# Patient Record
Sex: FEMALE | Race: WHITE | NOT HISPANIC OR LATINO | Employment: UNEMPLOYED | URBAN - METROPOLITAN AREA
[De-identification: names, ages, dates, MRNs, and addresses within clinical notes are randomized per-mention and may not be internally consistent; named-entity substitution may affect disease eponyms.]

---

## 2019-11-13 ENCOUNTER — OFFICE VISIT (OUTPATIENT)
Dept: OTOLARYNGOLOGY | Facility: CLINIC | Age: 54
End: 2019-11-13
Payer: COMMERCIAL

## 2019-11-13 VITALS
DIASTOLIC BLOOD PRESSURE: 76 MMHG | WEIGHT: 135 LBS | SYSTOLIC BLOOD PRESSURE: 110 MMHG | HEIGHT: 63 IN | OXYGEN SATURATION: 97 % | HEART RATE: 85 BPM | TEMPERATURE: 98.2 F | BODY MASS INDEX: 23.92 KG/M2

## 2019-11-13 DIAGNOSIS — H61.23 BILATERAL IMPACTED CERUMEN: Primary | ICD-10-CM

## 2019-11-13 PROCEDURE — 99999 PR OFFICE/OUTPT VISIT,PROCEDURE ONLY: CPT | Performed by: OTOLARYNGOLOGY

## 2019-11-13 PROCEDURE — 69210 REMOVE IMPACTED EAR WAX UNI: CPT | Performed by: OTOLARYNGOLOGY

## 2019-11-13 RX ORDER — GINKGO BILOBA LEAF EXTRACT 60 MG
500 CAPSULE ORAL
COMMUNITY

## 2019-11-13 RX ORDER — BUTYROSPERMUM PARKII(SHEA BUTTER), SIMMONDSIA CHINENSIS (JOJOBA) SEED OIL, ALOE BARBADENSIS LEAF EXTRACT .01; 1; 3.5 G/100G; G/100G; G/100G
250 LIQUID TOPICAL 2 TIMES DAILY
COMMUNITY

## 2019-11-13 RX ORDER — CHOLECALCIFEROL (VITAMIN D3) 25 MCG
1000 TABLET ORAL DAILY
COMMUNITY

## 2019-11-13 SDOH — HEALTH STABILITY: MENTAL HEALTH: HOW OFTEN DO YOU HAVE A DRINK CONTAINING ALCOHOL?: NEVER

## 2019-11-13 NOTE — LETTER
November 13, 2019     Randy Florian MD  [address]  [place] [address]    Patient: Neris Dunn  YOB: 1965  Date of Visit: 11/13/2019      Dear Dr. Florian:    Thank you for referring Neris Dunn to me for evaluation. Below are my notes for this consultation.    If you have questions, please do not hesitate to call me. I look forward to following your patient along with you.         Sincerely,        Shirley Renner MD        CC: No Recipients  Shirley Renner MD  11/13/2019  4:59 PM  Signed  Procedure only - cerumen removal.      Shirley Renner MD  11/13/2019  4:59 PM  Signed  Cerumen removal  Date/Time: 11/13/2019 4:57 PM  Performed by: Shirley Renner MD  Authorized by: Shirley Renner MD     Consent:     Consent obtained:  Verbal    Consent given by:  Patient    Cerumen Removal-   The right and left ear was examined under the otomicroscope and noted to have cerumen impaction obstruting adequate visualization of the tympanic membrane.  Using a combination of suction, curettes, alligator and micro instruments, the cerumen was successfully extracted from the right external auditory canal the patient could not fully tolerate complete removal from the left.  At the conclusion of the procedure the EAC was noted to be healthy and intact without evidence of bleeding or trama. The tympanic membrane on the right appeared intact, with normal landmarks and no evidence of effusion on the left the tympanic membrane was still not visualized patient was instructed to use Debrox eardrops daily for a week and follow-up in a week for repeat attempt at cerumen removal of the left external auditory canal    Patient has extremely narrow ear canals and was unable to tolerate complete removal on the left side

## 2019-11-13 NOTE — PROCEDURES
Cerumen removal  Date/Time: 11/13/2019 4:57 PM  Performed by: Shirley Renner MD  Authorized by: Shirley Renner MD     Consent:     Consent obtained:  Verbal    Consent given by:  Patient    Cerumen Removal-   The right and left ear was examined under the otomicroscope and noted to have cerumen impaction obstruting adequate visualization of the tympanic membrane.  Using a combination of suction, curettes, alligator and micro instruments, the cerumen was successfully extracted from the right external auditory canal the patient could not fully tolerate complete removal from the left.  At the conclusion of the procedure the EAC was noted to be healthy and intact without evidence of bleeding or trama. The tympanic membrane on the right appeared intact, with normal landmarks and no evidence of effusion on the left the tympanic membrane was still not visualized patient was instructed to use Debrox eardrops daily for a week and follow-up in a week for repeat attempt at cerumen removal of the left external auditory canal    Patient has extremely narrow ear canals and was unable to tolerate complete removal on the left side

## 2019-11-25 ENCOUNTER — OFFICE VISIT (OUTPATIENT)
Dept: OTOLARYNGOLOGY | Facility: CLINIC | Age: 54
End: 2019-11-25
Payer: COMMERCIAL

## 2019-11-25 VITALS
SYSTOLIC BLOOD PRESSURE: 112 MMHG | BODY MASS INDEX: 23.39 KG/M2 | WEIGHT: 132 LBS | OXYGEN SATURATION: 99 % | HEART RATE: 74 BPM | RESPIRATION RATE: 18 BRPM | TEMPERATURE: 98.4 F | DIASTOLIC BLOOD PRESSURE: 72 MMHG | HEIGHT: 63 IN

## 2019-11-25 DIAGNOSIS — H60.532 ACUTE CONTACT OTITIS EXTERNA OF LEFT EAR: Primary | ICD-10-CM

## 2019-11-25 PROCEDURE — 99999 PR OFFICE/OUTPT VISIT,PROCEDURE ONLY: CPT | Performed by: OTOLARYNGOLOGY

## 2019-11-25 PROCEDURE — 92504 EAR MICROSCOPY EXAMINATION: CPT | Performed by: OTOLARYNGOLOGY

## 2019-11-25 NOTE — LETTER
November 25, 2019     Randy Florian MD  [address]  [place] [address]    Patient: Neris Dunn  YOB: 1965  Date of Visit: 11/25/2019      Dear Dr. Florian:    Thank you for referring Neris Dunn to me for evaluation. Below are my notes for this consultation.    If you have questions, please do not hesitate to call me. I look forward to following your patient along with you.         Sincerely,        Shirley Renner MD        CC: No Recipients        Shirley Renner MD  11/25/2019  4:40 PM  Signed  Procedures  Attempted cerumen removal from the left external auditory canal with microscope and suction.  Patient had done Debrox however it seem like it did not go in the ear as she used it.  Cerumen still appears very dried and hard against a narrow bottleneck type ear canal.  Patient unable to tolerate it was mild amount of bleeding along the edges of her very narrow ear canal and erythematous as with this externa.  Will prescribe Ciprodex eardrops for 1 week and follow-up in retry.

## 2019-11-25 NOTE — PROCEDURES
Procedures  Attempted cerumen removal from the left external auditory canal with microscope and suction.  Patient had done Debrox however it seem like it did not go in the ear as she used it.  Cerumen still appears very dried and hard against a narrow bottleneck type ear canal.  Patient unable to tolerate it was mild amount of bleeding along the edges of her very narrow ear canal and erythematous as with this externa.  Will prescribe Ciprodex eardrops for 1 week and follow-up in retry.

## 2019-11-27 ENCOUNTER — TELEPHONE (OUTPATIENT)
Dept: OTOLARYNGOLOGY | Facility: CLINIC | Age: 54
End: 2019-11-27

## 2019-11-27 RX ORDER — CIPROFLOXACIN AND DEXAMETHASONE 3; 1 MG/ML; MG/ML
2 SUSPENSION/ DROPS AURICULAR (OTIC) 2 TIMES DAILY
Qty: 7.5 ML | Refills: 1 | Status: SHIPPED | OUTPATIENT
Start: 2019-11-27 | End: 2019-12-04

## 2019-12-02 ENCOUNTER — OFFICE VISIT (OUTPATIENT)
Dept: OTOLARYNGOLOGY | Facility: CLINIC | Age: 54
End: 2019-12-02
Payer: COMMERCIAL

## 2019-12-02 VITALS
SYSTOLIC BLOOD PRESSURE: 118 MMHG | BODY MASS INDEX: 23.92 KG/M2 | HEART RATE: 82 BPM | HEIGHT: 63 IN | DIASTOLIC BLOOD PRESSURE: 68 MMHG | TEMPERATURE: 98 F | OXYGEN SATURATION: 98 % | WEIGHT: 135 LBS

## 2019-12-02 DIAGNOSIS — H61.22 IMPACTED CERUMEN OF LEFT EAR: Primary | ICD-10-CM

## 2019-12-02 PROCEDURE — 99999 PR OFFICE/OUTPT VISIT,PROCEDURE ONLY: CPT | Performed by: OTOLARYNGOLOGY

## 2019-12-02 PROCEDURE — 69210 REMOVE IMPACTED EAR WAX UNI: CPT | Performed by: OTOLARYNGOLOGY

## 2019-12-02 NOTE — LETTER
December 2, 2019     Randy Florian MD  [address]  [place] [address]    Patient: Neris Dunn  YOB: 1965  Date of Visit: 12/2/2019      Dear Dr. Florian:    Thank you for referring Neris Dunn to me for evaluation. Below are my notes for this consultation.    If you have questions, please do not hesitate to call me. I look forward to following your patient along with you.         Sincerely,        Shirley Renner MD        CC: No Recipients        Shirley Renner MD  12/2/2019  2:56 PM  Signed  Cerumen removal  Date/Time: 12/2/2019 2:53 PM  Performed by: Shirley Renner MD  Authorized by: Shirley Renner MD     Consent:     Consent obtained:  Verbal    Consent given by:  Patient    Cerumen Removal-   The left ear was examined under the otomicroscope and noted to have cerumen impaction obstruting adequate visualization of the tympanic membrane.  Using a combination of suction, curettes, alligator and micro instruments, the cerumen was successfully extracted.  At the conclusion of the procedure the EAC was noted to be healthy and intact without evidence of bleeding or trama. The tympanic membrane appeared intact, with normal landmarks and no evidence of effusion    The left ear canal was very narrow after many attempts with peroxide and suction and nontraumatic manipulation with instruments in the ear canal cerumen was successfully removed and the eardrum was visualized and appeared normal.  There was mild erythema of the ear canal recommend continue Ciprodex for 3 days.  Going forward recommend use of Debrox or finishing the Ciprodex once every 1 to 2 weeks to prevent cerumen accumulation.

## 2019-12-02 NOTE — PROCEDURES
Cerumen removal  Date/Time: 12/2/2019 2:53 PM  Performed by: Shirley Renner MD  Authorized by: Shirley Renner MD     Consent:     Consent obtained:  Verbal    Consent given by:  Patient    Cerumen Removal-   The left ear was examined under the otomicroscope and noted to have cerumen impaction obstruting adequate visualization of the tympanic membrane.  Using a combination of suction, curettes, alligator and micro instruments, the cerumen was successfully extracted.  At the conclusion of the procedure the EAC was noted to be healthy and intact without evidence of bleeding or trama. The tympanic membrane appeared intact, with normal landmarks and no evidence of effusion    The left ear canal was very narrow after many attempts with peroxide and suction and nontraumatic manipulation with instruments in the ear canal cerumen was successfully removed and the eardrum was visualized and appeared normal.  There was mild erythema of the ear canal recommend continue Ciprodex for 3 days.  Going forward recommend use of Debrox or finishing the Ciprodex once every 1 to 2 weeks to prevent cerumen accumulation.

## 2022-07-12 ENCOUNTER — TRANSCRIBE ORDERS (OUTPATIENT)
Dept: RADIOLOGY | Facility: HOSPITAL | Age: 57
End: 2022-07-12

## 2022-07-12 ENCOUNTER — HOSPITAL ENCOUNTER (OUTPATIENT)
Dept: RADIOLOGY | Facility: HOSPITAL | Age: 57
Discharge: HOME | End: 2022-07-12
Attending: FAMILY MEDICINE
Payer: COMMERCIAL

## 2022-07-12 DIAGNOSIS — M72.2 PLANTAR FASCIAL FIBROMATOSIS: Primary | ICD-10-CM

## 2022-07-12 DIAGNOSIS — M72.2 PLANTAR FASCIAL FIBROMATOSIS: ICD-10-CM

## 2022-07-12 PROCEDURE — 73630 X-RAY EXAM OF FOOT: CPT | Mod: LT

## 2022-07-12 PROCEDURE — 73650 X-RAY EXAM OF HEEL: CPT | Mod: LT,59

## 2023-04-04 ENCOUNTER — APPOINTMENT (OUTPATIENT)
Dept: URBAN - METROPOLITAN AREA CLINIC 191 | Age: 58
Setting detail: DERMATOLOGY
End: 2023-04-05

## 2023-04-04 DIAGNOSIS — D22 MELANOCYTIC NEVI: ICD-10-CM

## 2023-04-04 DIAGNOSIS — L73.8 OTHER SPECIFIED FOLLICULAR DISORDERS: ICD-10-CM

## 2023-04-04 DIAGNOSIS — L44.8 OTHER SPECIFIED PAPULOSQUAMOUS DISORDERS: ICD-10-CM

## 2023-04-04 PROBLEM — D48.5 NEOPLASM OF UNCERTAIN BEHAVIOR OF SKIN: Status: ACTIVE | Noted: 2023-04-04

## 2023-04-04 PROCEDURE — 99202 OFFICE O/P NEW SF 15 MIN: CPT | Mod: 25

## 2023-04-04 PROCEDURE — 11102 TANGNTL BX SKIN SINGLE LES: CPT | Mod: 59

## 2023-04-04 PROCEDURE — OTHER COUNSELING: OTHER

## 2023-04-04 PROCEDURE — 17110 DESTRUCT B9 LESION 1-14: CPT

## 2023-04-04 PROCEDURE — OTHER LIQUID NITROGEN: OTHER

## 2023-04-04 PROCEDURE — OTHER BIOPSY BY SHAVE METHOD: OTHER

## 2023-04-04 ASSESSMENT — LOCATION DETAILED DESCRIPTION DERM
LOCATION DETAILED: RIGHT FOREHEAD
LOCATION DETAILED: RIGHT MEDIAL MALAR CHEEK
LOCATION DETAILED: LEFT SUPERIOR CENTRAL BUCCAL CHEEK
LOCATION DETAILED: RIGHT INFERIOR MEDIAL FOREHEAD

## 2023-04-04 ASSESSMENT — LOCATION SIMPLE DESCRIPTION DERM
LOCATION SIMPLE: RIGHT CHEEK
LOCATION SIMPLE: RIGHT FOREHEAD
LOCATION SIMPLE: LEFT CHEEK

## 2023-04-04 ASSESSMENT — LOCATION ZONE DERM: LOCATION ZONE: FACE

## 2023-12-18 ENCOUNTER — TRANSCRIBE ORDERS (OUTPATIENT)
Dept: SCHEDULING | Age: 58
End: 2023-12-18

## 2023-12-18 DIAGNOSIS — Z82.49 FAMILY HISTORY OF ISCHEMIC HEART DISEASE AND OTHER DISEASES OF THE CIRCULATORY SYSTEM: Primary | ICD-10-CM

## 2023-12-19 ENCOUNTER — HOSPITAL ENCOUNTER (OUTPATIENT)
Dept: RADIOLOGY | Facility: HOSPITAL | Age: 58
Discharge: HOME | End: 2023-12-19
Attending: FAMILY MEDICINE

## 2023-12-19 DIAGNOSIS — Z82.49 FAMILY HISTORY OF ISCHEMIC HEART DISEASE AND OTHER DISEASES OF THE CIRCULATORY SYSTEM: ICD-10-CM

## 2023-12-19 PROCEDURE — 75571 CT HRT W/O DYE W/CA TEST: CPT
